# Patient Record
Sex: MALE | Race: OTHER | URBAN - METROPOLITAN AREA
[De-identification: names, ages, dates, MRNs, and addresses within clinical notes are randomized per-mention and may not be internally consistent; named-entity substitution may affect disease eponyms.]

---

## 2019-08-01 ENCOUNTER — EMERGENCY (EMERGENCY)
Facility: HOSPITAL | Age: 29
LOS: 1 days | Discharge: HOME | End: 2019-08-01
Admitting: EMERGENCY MEDICINE
Payer: COMMERCIAL

## 2019-08-01 VITALS
WEIGHT: 197.98 LBS | SYSTOLIC BLOOD PRESSURE: 131 MMHG | TEMPERATURE: 99 F | DIASTOLIC BLOOD PRESSURE: 88 MMHG | RESPIRATION RATE: 18 BRPM | HEART RATE: 82 BPM | OXYGEN SATURATION: 99 % | HEIGHT: 66 IN

## 2019-08-01 PROCEDURE — 99283 EMERGENCY DEPT VISIT LOW MDM: CPT

## 2019-08-01 RX ORDER — IBUPROFEN 200 MG
800 TABLET ORAL ONCE
Refills: 0 | Status: COMPLETED | OUTPATIENT
Start: 2019-08-01 | End: 2019-08-01

## 2019-08-01 RX ORDER — LIDOCAINE 4 G/100G
1 CREAM TOPICAL ONCE
Refills: 0 | Status: COMPLETED | OUTPATIENT
Start: 2019-08-01 | End: 2019-08-01

## 2019-08-01 RX ADMIN — Medication 800 MILLIGRAM(S): at 20:15

## 2019-08-01 RX ADMIN — LIDOCAINE 1 PATCH: 4 CREAM TOPICAL at 20:15
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
## 2019-08-01 NOTE — ED PROVIDER NOTE - MUSCULOSKELETAL, MLM
Spine appears normal, range of motion is not limited, no localized point of tenderness to spinal vertebrae, b/l cervical muscles tenderness
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
## 2019-08-01 NOTE — ED PROVIDER NOTE - NSFOLLOWUPINSTRUCTIONS_ED_ALL_ED_FT
I have discussed the discharge plan with the patient. The patient agrees with the plan, as discussed.  The patient understands Emergency Department diagnosis is a preliminary diagnosis often based on limited information and that the patient must adhere to the follow-up plan as discussed.  The patient understands that if the symptoms worsen or if prescribed medications do not have the desired/planned effect that the patient may return to the Emergency Department at any time for further evaluation and treatment.        MOTOR VEHICLE ACCIDENT - AfterCare(R) Instructions(ER/ED)     Motor Vehicle Accident    WHAT YOU NEED TO KNOW:    A motor vehicle accident (MVA) can cause injury from the impact or from being thrown around inside the car. You may have a bruise on your abdomen, chest, or neck from the seatbelt. You may also have pain in your face, neck, or back. You may have pain in your knee, hip, or thigh if your body hits the dash or the steering wheel. Muscle pain is commonly worse 1 to 2 days after an MVA.    DISCHARGE INSTRUCTIONS:    Call your local emergency number (911 in the ) if:     You have new or worsening chest pain or shortness of breath.        Call your doctor if:     You have new or worsening pain in your abdomen.      You have nausea and vomiting that does not get better.      You have a severe headache.      You have weakness, tingling, or numbness in your arms or legs.      You have new or worsening pain that makes it hard for you to move.      You have pain that develops 2 to 3 days after the MVA.      You have questions or concerns about your condition or care.    Medicines:     Pain medicine: You may be given medicine to take away or decrease pain. Do not wait until the pain is severe before you take your medicine.      NSAIDs, such as ibuprofen, help decrease swelling, pain, and fever. This medicine is available with or without a doctor's order. NSAIDs can cause stomach bleeding or kidney problems in certain people. If you take blood thinner medicine, always ask if NSAIDs are safe for you. Always read the medicine label and follow directions. Do not give these medicines to children under 6 months of age without direction from your child's healthcare provider.      Take your medicine as directed. Contact your healthcare provider if you think your medicine is not helping or if you have side effects. Tell him of her if you are allergic to any medicine. Keep a list of the medicines, vitamins, and herbs you take. Include the amounts, and when and why you take them. Bring the list or the pill bottles to follow-up visits. Carry your medicine list with you in case of an emergency.    Self-care:     Use ice and heat. Ice helps decrease swelling and pain. Ice may also help prevent tissue damage. Use an ice pack, or put crushed ice in a plastic bag. Cover it with a towel and apply to your injured area for 15 to 20 minutes every hour, or as directed. After 2 days, use a heating pad on your injured area. Use heat as directed.       Gently stretch. Use gentle exercises to stretch your muscles after an MVA. Ask your healthcare provider for exercises you can do.     Safety tips: The following can help prevent another MVA or lower your risk for injury:     Always wear your seatbelt. This will help reduce serious injury from an MVA. The seatbelt should have one strap that goes across your chest and another that goes across your lap.      Always put your child in a child safety seat. Use a safety seat made for his or her age, height, and weight. Choose a safety seat that has a harness and clip. Place the safety seat in the middle of the car's back seat. The safety seat should not move more than 1 inch in any direction after you secure it. Always follow the instructions provided for your safety seat to help you position it. The instructions will also guide you on how to secure your child properly. Ask your healthcare provider for more information about child safety seats. Child Safety Seat           Decrease speed. Drive the speed limit to reduce your risk for an MVA.      Do not drive if you are tired. You will react more slowly when you are tired. The slowed reaction time will increase your risk for an MVA.      Do not talk or text on your cell phone while you drive. You cannot respond fast enough in an emergency if you are distracted by texts or conversations.      Do not use drugs or drink alcohol before you drive. You may be more tired or take risks that you normally would not take. Do not drive after you take medicine that makes you sleepy. Use a designated  or arrange for a ride home.      Help your teenager become a safe . Be a good role model with your own driving. Talk to your teen about ways to lower the risk for an MVA. These include not driving when tired and not having distractions, such as a phone. Remind your teen to always go the speed limit and to wear a seatbelt.    Follow up with your healthcare provider as directed: Write down your questions so you remember to ask them during your visits.     Cervical Sprain  Image   A cervical sprain is a stretch or tear in one or more of the tough, cord-like tissues that connect bones (ligaments) in the neck. Cervical sprains can range from mild to severe. Severe cervical sprains can cause the spinal bones (vertebrae) in the neck to be unstable. This can lead to spinal cord damage and can result in serious nervous system problems.    The amount of time that it takes for a cervical sprain to get better depends on the cause and extent of the injury. Most cervical sprains heal in 4–6 weeks.    What are the causes?  Cervical sprains may be caused by an injury (trauma), such as from a motor vehicle accident, a fall, or sudden forward and backward whipping movement of the head and neck (whiplash injury).    Mild cervical sprains may be caused by wear and tear over time, such as from poor posture, sitting in a chair that does not provide support, or looking up or down for long periods of time.    What increases the risk?  The following factors may make you more likely to develop this condition:  Participating in activities that have a high risk of trauma to the neck. These include contact sports, auto racing, gymnastics, and diving.  Taking risks when driving or riding in a motor vehicle, such as speeding.  Having osteoarthritis of the spine.  Having poor strength and flexibility of the neck.  A previous neck injury.  Having poor posture.  Spending a lot of time in certain positions that put stress on the neck, such as sitting at a computer for long periods of time.  What are the signs or symptoms?  Symptoms of this condition include:  Pain, soreness, stiffness, tenderness, swelling, or a burning sensation in the front, back, or sides of the neck.  Sudden tightening of neck muscles that you cannot control (muscle spasms).  Pain in the shoulders or upper back.  Limited ability to move the neck.  Headache.  Dizziness.  Nausea.  Vomiting.  Weakness, numbness, or tingling in a hand or an arm.  Symptoms may develop right away after injury, or they may develop over a few days. In some cases, symptoms may go away with treatment and return (recur) over time.    How is this diagnosed?  This condition may be diagnosed based on:  Your medical history.  Your symptoms.  Any recent injuries or known neck problems that you have, such as arthritis in the neck.  A physical exam.  Imaging tests, such as:  X-rays.  MRI.  CT scan.  How is this treated?  This condition is treated by resting and icing the injured area and doing physical therapy exercises. Depending on the severity of your condition, treatment may also include:  Keeping your neck in place (immobilized) for periods of time. This may be done using:  A cervical collar. This supports your chin and the back of your head.  A cervical traction device. This is a sling that holds up your head. This removes weight and pressure from your neck, and it may help to relieve pain.  Medicines that help to relieve pain and inflammation.  Medicines that help to relax your muscles (muscle relaxants).  Surgery. This is rare.  Follow these instructions at home:  If you have a cervical collar:     Image   Wear it as told by your health care provider. Do not remove the collar unless instructed by your health care provider.  Ask your health care provider before you make any adjustments to your collar.  If you have long hair, keep it outside of the collar.  Ask your health care provider if you can remove the collar for cleaning and bathing. If you are allowed to remove the collar for cleaning or bathing:  Follow instructions from your health care provider about how to remove the collar safely.  Clean the collar by wiping it with mild soap and water and drying it completely.  If your collar has removable pads, remove them every 1–2 days and wash them by hand with soap and water. Let them air-dry completely before you put them back in the collar.  Check your skin under the collar for irritation or sores. If you see any, tell your health care provider.  Managing pain, stiffness, and swelling     Image   If directed, use a cervical traction device as told by your health care provider.  If directed, apply heat to the affected area before you do your physical therapy or as often as told by your health care provider. Use the heat source that your health care provider recommends, such as a moist heat pack or a heating pad.  Place a towel between your skin and the heat source.  Leave the heat on for 20–30 minutes.  Remove the heat if your skin turns bright red. This is especially important if you are unable to feel pain, heat, or cold. You may have a greater risk of getting burned.  If directed, put ice on the affected area:  Put ice in a plastic bag.  Place a towel between your skin and the bag.  Leave the ice on for 20 minutes, 2–3 times a day.  Activity     Do not drive while wearing a cervical collar. If you do not have a cervical collar, ask your health care provider if it is safe to drive while your neck heals.  Do not drive or use heavy machinery while taking prescription pain medicine or muscle relaxants, unless your health care provider approves.  Do not lift anything that is heavier than 10 lb (4.5 kg) until your health care provider tells you that it is safe.  Rest as directed by your health care provider. Avoid positions and activities that make your symptoms worse. Ask your health care provider what activities are safe for you.  If physical therapy was prescribed, do exercises as told by your health care provider or physical therapist.  General instructions     Take over-the-counter and prescription medicines only as told by your health care provider.  Do not use any products that contain nicotine or tobacco, such as cigarettes and e-cigarettes. These can delay healing. If you need help quitting, ask your health care provider.  Keep all follow-up visits as told by your health care provider or physical therapist. This is important.  How is this prevented?  To prevent a cervical sprain from happening again:  Use and maintain good posture. Make any needed adjustments to your workstation to help you use good posture.  Exercise regularly as directed by your health care provider or physical therapist.  Avoid risky activities that may cause a cervical sprain.  Contact a health care provider if:  You have symptoms that get worse or do not get better after 2 weeks of treatment.  You have pain that gets worse or does not get better with medicine.  You develop new, unexplained symptoms.  You have sores or irritated skin on your neck from wearing your cervical collar.  Get help right away if:  You have severe pain.  You develop numbness, tingling, or weakness in any part of your body.  You cannot move a part of your body (you have paralysis).  You have neck pain along with:  Severe dizziness.  Headache.  Summary  A cervical sprain is a stretch or tear in one or more of the tough, cord-like tissues that connect bones (ligaments) in the neck.  Cervical sprains may be caused by an injury (trauma), such as from a motor vehicle accident, a fall, or sudden forward and backward whipping movement of the head and neck (whiplash injury).  Symptoms may develop right away after injury, or they may develop over a few days.  This condition is treated by resting and icing the injured area and doing physical therapy exercises.  This information is not intended to replace advice given to you by your health care provider. Make sure you discuss any questions you have with your health care provider.
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
## 2019-08-01 NOTE — ED PROVIDER NOTE - CHPI ED SYMPTOMS NEG
no headache, no vision changes, no chest pain, no SOB, no shoulder pain, no abdominal pain, no numbness/weakness to any extremity.
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
## 2019-08-11 DIAGNOSIS — M54.2 CERVICALGIA: ICD-10-CM
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
## 2019-08-11 DIAGNOSIS — S13.4XXA SPRAIN OF LIGAMENTS OF CERVICAL SPINE, INITIAL ENCOUNTER: ICD-10-CM
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
